# Patient Record
Sex: MALE | Race: WHITE | NOT HISPANIC OR LATINO | Employment: OTHER | URBAN - NONMETROPOLITAN AREA
[De-identification: names, ages, dates, MRNs, and addresses within clinical notes are randomized per-mention and may not be internally consistent; named-entity substitution may affect disease eponyms.]

---

## 2023-04-01 ENCOUNTER — HOSPITAL ENCOUNTER (EMERGENCY)
Facility: HOSPITAL | Age: 73
Discharge: HOME | End: 2023-04-01
Attending: EMERGENCY MEDICINE | Admitting: EMERGENCY MEDICINE
Payer: MEDICARE

## 2023-04-01 ENCOUNTER — APPOINTMENT (OUTPATIENT)
Dept: CT IMAGING | Facility: HOSPITAL | Age: 73
End: 2023-04-01
Payer: MEDICARE

## 2023-04-01 VITALS
OXYGEN SATURATION: 99 % | BODY MASS INDEX: 25.11 KG/M2 | HEIGHT: 67 IN | DIASTOLIC BLOOD PRESSURE: 77 MMHG | HEART RATE: 72 BPM | RESPIRATION RATE: 16 BRPM | WEIGHT: 160 LBS | TEMPERATURE: 97.9 F | SYSTOLIC BLOOD PRESSURE: 112 MMHG
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
## 2023-04-01 DIAGNOSIS — S00.81XA FACIAL ABRASION, INITIAL ENCOUNTER: ICD-10-CM

## 2023-04-01 PROCEDURE — 99282 EMERGENCY DEPT VISIT SF MDM: CPT

## 2023-04-01 PROCEDURE — 70486 CT MAXILLOFACIAL W/O DYE: CPT

## 2023-04-01 PROCEDURE — 70450 CT HEAD/BRAIN W/O DYE: CPT

## 2023-04-01 NOTE — DISCHARGE INSTRUCTIONS
Keep wounds clean.  Clean with gentle soap and water daily.  Take Tylenol and as needed per directions on the package.  Follow-up with your PCP for further outpatient evaluation as needed.  Return to the ER for new or worsening symptoms or acute concerns.

## 2023-04-01 NOTE — ED PROVIDER NOTES
"Subjective:  Chief Complaint:  Head trauma    History of Present Illness:  Patient is a 72-year-old male with history of diabetes, hyperlipidemia, hypertension, scoliosis presenting to the ER with complaints of face and head trauma after a fall.  Patient states that he got a new cane about a week ago and has been adjusting to using it.  He states that he fell on concrete and hit his face.  He has abrasions to his face.  Denies anticoagulant use.  Denies loss of consciousness but states he was tired after the accident.  He states he has ambulated some since the fall but not much.  Denies additional symptoms or complaints at this time.      Nurses Notes reviewed and agree, including vitals, allergies, social history and prior medical history.     REVIEW OF SYSTEMS: All systems reviewed and not pertinent unless noted.  Review of Systems   Skin: Positive for wound.   Neurological: Positive for headaches.   All other systems reviewed and are negative.      Past Medical History:   Diagnosis Date   • Diabetes mellitus    • Hyperlipidemia    • Hypertension    • Scoliosis        Allergies:    Patient has no known allergies.      Past Surgical History:   Procedure Laterality Date   • GALLBLADDER SURGERY           Social History     Socioeconomic History   • Marital status:    Tobacco Use   • Smoking status: Never         History reviewed. No pertinent family history.    Objective  Physical Exam:  /74 (BP Location: Left arm, Patient Position: Sitting)   Pulse 69   Temp 97.9 °F (36.6 °C) (Oral)   Resp 17   Ht 170.2 cm (67\")   Wt 72.6 kg (160 lb)   SpO2 98%   BMI 25.06 kg/m²      Physical Exam  Vitals and nursing note reviewed.   Constitutional:       General: He is not in acute distress.     Appearance: He is not toxic-appearing.   HENT:      Head: Normocephalic.      Comments: 2 superficial facial abrasions with no laceration or anything amenable to suturing     Right Ear: External ear normal.      Left " Ear: External ear normal.      Nose: Nose normal.   Eyes:      Extraocular Movements: Extraocular movements intact.      Conjunctiva/sclera: Conjunctivae normal.      Pupils: Pupils are equal, round, and reactive to light.   Cardiovascular:      Rate and Rhythm: Normal rate.   Pulmonary:      Effort: Pulmonary effort is normal. No respiratory distress.   Abdominal:      General: There is no distension.      Palpations: Abdomen is soft.   Musculoskeletal:         General: Normal range of motion.      Cervical back: Normal range of motion and neck supple.   Skin:     General: Skin is warm and dry.   Neurological:      General: No focal deficit present.      Mental Status: He is alert and oriented to person, place, and time.   Psychiatric:         Mood and Affect: Mood normal.         Behavior: Behavior normal.         Procedures    ED Course:         Lab Results (last 24 hours)     ** No results found for the last 24 hours. **           CT Head Without Contrast    Result Date: 4/1/2023  PROCEDURE: CT HEAD WO CONTRAST-  HISTORY: fall, head trauma  COMPARISON:  None .  TECHNIQUE: Multiple axial CT images were performed from the foramen magnum to the vertex without enhancement.  FINDINGS: There is no CT evidence of hemorrhage. There is no mass, mass effect or midline shift.  There is no hydrocephalus. The paranasal sinuses are clear. Bone windows reveal no acute osseous abnormalities.      Impression: No acute hemorhage, mass effect, or midline shift..    1413.28 mGy.cm   This study was performed with techniques to keep radiation doses as low as reasonably achievable (ALARA). Individualized dose reduction techniques using automated exposure control or adjustment of mA and/or kV according to the patient size were employed.  This report was signed and finalized on 4/1/2023 2:57 PM by Jose Palomo DO.    CT Facial Bones Without Contrast    Result Date: 4/1/2023  PROCEDURE: CT FACIAL BONES WO CONTRAST-  HISTORY: fall, facial  trauma  COMPARISON: None .  TECHNIQUE: Multiple axial CT sections were performed through the face without contrast. Coronal reconstruction images were performed.  FINDINGS: The patient is edentulous. No displaced fracture was appreciated. No air-fluid levels in the paranasal sinuses. Left nasal septal deviation is noted. Mastoid air cells are well pneumatized. Orbits are intact.        Impression: No facial bone fracture.   This study was performed with techniques to keep radiation doses as low as reasonably achievable (ALARA). Individualized dose reduction techniques using automated exposure control or adjustment of mA and/or kV according to the patient size were employed.  This report was signed and finalized on 4/1/2023 2:53 PM by Jose Palomo DO.         MDM  Patient was evaluated in the ER for facial trauma after he fell and hit his face on concrete.  He is hemodynamically stable, afebrile, nontoxic-appearing on exam.  No focal neurologic deficits.  Differential diagnosis includes but not limited to concussion, intracranial hemorrhage, facial fracture, among others.  Initial plan includes CT of head and facial bones.    CT of head and facial bones is without acute abnormality per radiology.  Patient is agreeable with plan for discharge.  He was advised to follow-up with his PCP for further outpatient evaluation as needed.  Precautions were given for return to the ER for any new or worsening symptoms.    Final diagnoses:   Fall, initial encounter   Facial abrasion, initial encounter        Karina Terrazas PA-C  04/01/23 1825